# Patient Record
Sex: FEMALE | Race: WHITE | NOT HISPANIC OR LATINO | URBAN - METROPOLITAN AREA
[De-identification: names, ages, dates, MRNs, and addresses within clinical notes are randomized per-mention and may not be internally consistent; named-entity substitution may affect disease eponyms.]

---

## 2023-03-22 DIAGNOSIS — F41.9 ANXIETY: Primary | ICD-10-CM

## 2023-03-22 RX ORDER — DULOXETIN HYDROCHLORIDE 60 MG/1
60 CAPSULE, DELAYED RELEASE ORAL DAILY
COMMUNITY
Start: 2022-11-28 | End: 2023-03-22 | Stop reason: SDUPTHER

## 2023-03-22 RX ORDER — DULOXETIN HYDROCHLORIDE 60 MG/1
60 CAPSULE, DELAYED RELEASE ORAL DAILY
Qty: 90 CAPSULE | Refills: 0 | Status: SHIPPED | OUTPATIENT
Start: 2023-03-22 | End: 2023-04-04 | Stop reason: SDUPTHER

## 2023-03-26 PROBLEM — B37.2 CUTANEOUS CANDIDIASIS: Status: ACTIVE | Noted: 2023-03-26

## 2023-03-26 PROBLEM — H69.90 ETD (EUSTACHIAN TUBE DYSFUNCTION): Status: ACTIVE | Noted: 2023-03-26

## 2023-03-26 PROBLEM — E55.9 HYPOVITAMINOSIS D: Status: ACTIVE | Noted: 2023-03-26

## 2023-03-26 PROBLEM — J45.20 MILD INTERMITTENT ASTHMA (HHS-HCC): Status: ACTIVE | Noted: 2023-03-26

## 2023-03-26 PROBLEM — F41.9 ANXIETY AND DEPRESSION: Status: ACTIVE | Noted: 2023-03-26

## 2023-03-26 PROBLEM — F32.A ANXIETY AND DEPRESSION: Status: ACTIVE | Noted: 2023-03-26

## 2023-03-26 PROBLEM — R63.5 WEIGHT GAIN: Status: ACTIVE | Noted: 2023-03-26

## 2023-03-26 PROBLEM — J45.909 ASTHMA (HHS-HCC): Status: ACTIVE | Noted: 2023-03-26

## 2023-03-26 PROBLEM — K64.9 HEMORRHOID: Status: ACTIVE | Noted: 2023-03-26

## 2023-03-26 PROBLEM — E73.9 LACTOSE INTOLERANCE: Status: ACTIVE | Noted: 2023-03-26

## 2023-03-26 PROBLEM — K59.00 CONSTIPATION: Status: ACTIVE | Noted: 2023-03-26

## 2023-03-26 PROBLEM — N92.6 IRREGULAR MENSTRUAL CYCLE: Status: ACTIVE | Noted: 2023-03-26

## 2023-03-26 PROBLEM — F41.1 GENERALIZED ANXIETY DISORDER: Status: ACTIVE | Noted: 2023-03-26

## 2023-03-31 ENCOUNTER — APPOINTMENT (OUTPATIENT)
Dept: PRIMARY CARE | Facility: CLINIC | Age: 38
End: 2023-03-31
Payer: COMMERCIAL

## 2023-04-04 ENCOUNTER — OFFICE VISIT (OUTPATIENT)
Dept: PRIMARY CARE | Facility: CLINIC | Age: 38
End: 2023-04-04
Payer: COMMERCIAL

## 2023-04-04 VITALS
SYSTOLIC BLOOD PRESSURE: 100 MMHG | OXYGEN SATURATION: 98 % | BODY MASS INDEX: 28.35 KG/M2 | HEIGHT: 63 IN | DIASTOLIC BLOOD PRESSURE: 64 MMHG | HEART RATE: 68 BPM | WEIGHT: 160 LBS | TEMPERATURE: 97.8 F | RESPIRATION RATE: 16 BRPM

## 2023-04-04 DIAGNOSIS — F41.9 ANXIETY AND DEPRESSION: ICD-10-CM

## 2023-04-04 DIAGNOSIS — N92.6 IRREGULAR MENSTRUAL CYCLE: ICD-10-CM

## 2023-04-04 DIAGNOSIS — F41.9 ANXIETY: ICD-10-CM

## 2023-04-04 DIAGNOSIS — F41.1 GENERALIZED ANXIETY DISORDER: Primary | ICD-10-CM

## 2023-04-04 DIAGNOSIS — F32.A ANXIETY AND DEPRESSION: ICD-10-CM

## 2023-04-04 PROCEDURE — 99214 OFFICE O/P EST MOD 30 MIN: CPT | Performed by: FAMILY MEDICINE

## 2023-04-04 PROCEDURE — 1036F TOBACCO NON-USER: CPT | Performed by: FAMILY MEDICINE

## 2023-04-04 RX ORDER — DULOXETIN HYDROCHLORIDE 60 MG/1
60 CAPSULE, DELAYED RELEASE ORAL DAILY
Qty: 90 CAPSULE | Refills: 1 | Status: SHIPPED | OUTPATIENT
Start: 2023-04-04 | End: 2023-08-07 | Stop reason: SINTOL

## 2023-04-04 RX ORDER — LEVONORGESTREL 52 MG/1
1 INTRAUTERINE DEVICE INTRAUTERINE ONCE
COMMUNITY
End: 2024-05-13 | Stop reason: WASHOUT

## 2023-04-04 ASSESSMENT — ENCOUNTER SYMPTOMS
WEIGHT GAIN: 0
PANIC: 0
WEIGHT LOSS: 0
DEPRESSION: 1
THOUGHTS THAT DEATH WOULD BE EASIER: 0

## 2023-04-04 NOTE — PATIENT INSTRUCTIONS
Today we followed up on your anxiety and you are doing really well on the cymbalta .  Refills sent.   Follow up in 6 months for a medication check or we can see you in December for your physical and refill meds at that time.     Continue counseling/therapy and continue your exercise/yoga habits as these are all very helpful for your overall mental and physical health

## 2023-04-04 NOTE — PROGRESS NOTES
"Subjective   Patient ID: Angie Raymundo is a 38 y.o. female who presents for Depression.    She was on prozac last summer.  She was on a high dose so we decided to switch her because she wasn't able to access her \"normal\" emotions.  Since she switched over she has been able to feel more of a spectrum of emotions.  She feels like her HR is faster than normal but it is in the normal range.  She does feel like it is really helping she was off it for a week but she then restarted it when she picked it up again and feels good.  She doesn't necessarily want to be on it long term.      She started going to a  in December, doing yoga and also spinning again.          Depression  Visit Type: follow-up  Patient is not experiencing: panic, suicidal ideas, suicidal planning, thoughts of death, weight gain and weight loss.           Review of Systems   Constitutional:  Negative for weight gain and weight loss.   Psychiatric/Behavioral:  Positive for depression. Negative for suicidal ideas.        Objective   /64   Pulse 68   Temp 36.6 °C (97.8 °F)   Resp 16   Ht 1.6 m (5' 3\")   Wt 72.6 kg (160 lb)   SpO2 98%   BMI 28.34 kg/m²     Physical Exam  Constitutional:       Appearance: Normal appearance.   HENT:      Head: Normocephalic and atraumatic.      Mouth/Throat:      Mouth: Mucous membranes are moist.   Eyes:      Pupils: Pupils are equal, round, and reactive to light.   Cardiovascular:      Rate and Rhythm: Normal rate.      Pulses: Normal pulses.   Musculoskeletal:      Cervical back: Normal range of motion and neck supple.   Skin:     General: Skin is warm and dry.   Neurological:      General: No focal deficit present.      Mental Status: She is alert and oriented to person, place, and time. Mental status is at baseline.   Psychiatric:         Mood and Affect: Mood normal.         Behavior: Behavior normal.         Thought Content: Thought content normal.         Judgment: Judgment normal. "         Assessment/Plan   Diagnoses and all orders for this visit:  Generalized anxiety disorder  Anxiety and depression  Irregular menstrual cycle  Anxiety  -     DULoxetine (Cymbalta) 60 mg DR capsule; Take 1 capsule (60 mg) by mouth once daily.

## 2023-08-07 ENCOUNTER — OFFICE VISIT (OUTPATIENT)
Dept: PRIMARY CARE | Facility: CLINIC | Age: 38
End: 2023-08-07
Payer: COMMERCIAL

## 2023-08-07 VITALS
BODY MASS INDEX: 29.05 KG/M2 | RESPIRATION RATE: 16 BRPM | TEMPERATURE: 98.3 F | OXYGEN SATURATION: 98 % | WEIGHT: 164 LBS | DIASTOLIC BLOOD PRESSURE: 76 MMHG | HEART RATE: 72 BPM | SYSTOLIC BLOOD PRESSURE: 112 MMHG

## 2023-08-07 DIAGNOSIS — R63.5 WEIGHT GAIN: ICD-10-CM

## 2023-08-07 DIAGNOSIS — G47.00 INSOMNIA, UNSPECIFIED TYPE: ICD-10-CM

## 2023-08-07 DIAGNOSIS — F41.9 ANXIETY AND DEPRESSION: Primary | ICD-10-CM

## 2023-08-07 DIAGNOSIS — R45.4 IRRITABILITY: ICD-10-CM

## 2023-08-07 DIAGNOSIS — F32.A ANXIETY AND DEPRESSION: Primary | ICD-10-CM

## 2023-08-07 PROCEDURE — 1036F TOBACCO NON-USER: CPT | Performed by: FAMILY MEDICINE

## 2023-08-07 PROCEDURE — 99213 OFFICE O/P EST LOW 20 MIN: CPT | Performed by: FAMILY MEDICINE

## 2023-08-07 NOTE — PATIENT INSTRUCTIONS
Today we have addressed your mood changes and difficulty losing weight    You are going to talk to gyn about changed the mirena to a paraguard IUD  If it's going to take a few months for an appointment then we will restart you on low dose 30mg of cymbalta.   If they can see you soon and change it out then you will do that instead and try to see how your moods are without any hormone or medication on board.    You are also going to try to find a new therapist.   You are also working on diet/exercise for healthy habits long term  Please let me know how I can help going forward.

## 2023-08-07 NOTE — PROGRESS NOTES
Subjective   Patient ID: Angie Raymundo is a 38 y.o. female who presents for Anxiety (Patient tapered off the Cymbalta on her own thinking that the weight gain was related to the med. She is wondering if she should have removed the IUD instead of tapering off the Cymbalta. ).    She had been taking the cymbalta and working through some personal things.  She did not want to get pregnant so she got an IUD.  She is with a new partner.      Since getting the Mirena she has gained weight and cannot lose it. She had been feeling really god so she tapered off the cymbalta.  For the last couple of weeks though she is more irritable and she isn't sleeping well.  She has been off the cymbalta for about 6 weeks. She tapered it down over 2 weeks every other day, etc. Had some headaches when she was taking it.  Her mood swings are much worse all of a sudden.  Her periods have been extremely light for the past 4 months with the mirena.  - dark in color however.      Doesn't eat enough and when she does eat she eats more because she forgot to eat in a day.    She goes to yoga on Sundays but other than that she is moving a lot less    Anxiety             Review of Systems    Objective   /76   Pulse 72   Temp 36.8 °C (98.3 °F)   Resp 16   Wt 74.4 kg (164 lb)   SpO2 98%   BMI 29.05 kg/m²     Physical Exam  Constitutional:       Appearance: Normal appearance.   HENT:      Head: Normocephalic.   Pulmonary:      Effort: Pulmonary effort is normal. No respiratory distress.   Musculoskeletal:      Cervical back: Normal range of motion.   Skin:     General: Skin is warm.   Neurological:      Mental Status: She is alert.         Assessment/Plan   Diagnoses and all orders for this visit:  Anxiety and depression  Weight gain  Irritability  Insomnia, unspecified type

## 2024-05-13 ENCOUNTER — OFFICE VISIT (OUTPATIENT)
Dept: PRIMARY CARE | Facility: CLINIC | Age: 39
End: 2024-05-13
Payer: COMMERCIAL

## 2024-05-13 VITALS
OXYGEN SATURATION: 100 % | RESPIRATION RATE: 16 BRPM | HEIGHT: 63 IN | TEMPERATURE: 98.2 F | WEIGHT: 164 LBS | SYSTOLIC BLOOD PRESSURE: 110 MMHG | DIASTOLIC BLOOD PRESSURE: 68 MMHG | HEART RATE: 62 BPM | BODY MASS INDEX: 29.06 KG/M2

## 2024-05-13 DIAGNOSIS — Z12.4 CERVICAL CANCER SCREENING: ICD-10-CM

## 2024-05-13 DIAGNOSIS — F41.9 ANXIETY AND DEPRESSION: ICD-10-CM

## 2024-05-13 DIAGNOSIS — N92.6 IRREGULAR MENSTRUAL CYCLE: ICD-10-CM

## 2024-05-13 DIAGNOSIS — R63.5 WEIGHT GAIN: ICD-10-CM

## 2024-05-13 DIAGNOSIS — Z12.31 BREAST CANCER SCREENING BY MAMMOGRAM: ICD-10-CM

## 2024-05-13 DIAGNOSIS — Z00.00 HEALTHCARE MAINTENANCE: Primary | ICD-10-CM

## 2024-05-13 DIAGNOSIS — F32.A ANXIETY AND DEPRESSION: ICD-10-CM

## 2024-05-13 LAB
POC APPEARANCE, URINE: CLEAR
POC BILIRUBIN, URINE: NEGATIVE
POC BLOOD, URINE: NEGATIVE
POC COLOR, URINE: YELLOW
POC GLUCOSE, URINE: NEGATIVE MG/DL
POC KETONES, URINE: NEGATIVE MG/DL
POC LEUKOCYTES, URINE: NEGATIVE
POC NITRITE,URINE: NEGATIVE
POC PH, URINE: 6 PH
POC PROTEIN, URINE: NEGATIVE MG/DL
POC SPECIFIC GRAVITY, URINE: 1.01
POC UROBILINOGEN, URINE: 0.2 EU/DL

## 2024-05-13 PROCEDURE — 99396 PREV VISIT EST AGE 40-64: CPT | Performed by: FAMILY MEDICINE

## 2024-05-13 PROCEDURE — 1036F TOBACCO NON-USER: CPT | Performed by: FAMILY MEDICINE

## 2024-05-13 PROCEDURE — 81002 URINALYSIS NONAUTO W/O SCOPE: CPT | Performed by: FAMILY MEDICINE

## 2024-05-13 PROCEDURE — 99213 OFFICE O/P EST LOW 20 MIN: CPT | Performed by: FAMILY MEDICINE

## 2024-05-13 RX ORDER — COPPER 313.4 MG/1
INTRAUTERINE DEVICE INTRAUTERINE
COMMUNITY

## 2024-05-13 ASSESSMENT — PATIENT HEALTH QUESTIONNAIRE - PHQ9
1. LITTLE INTEREST OR PLEASURE IN DOING THINGS: NOT AT ALL
SUM OF ALL RESPONSES TO PHQ9 QUESTIONS 1 AND 2: 1
2. FEELING DOWN, DEPRESSED OR HOPELESS: SEVERAL DAYS

## 2024-05-13 NOTE — PATIENT INSTRUCTIONS
Today we performed your Annual Physical Exam.  Your preventative health care, labs and vaccinations have been reviewed and are up to date.      For your irregular cycles I have ordered additional labs and I recommend that you continue therapy/counseling due to your stress level/anxiety. You are not interested in medication at this time but the door is open to discuss this option if you need it.

## 2024-05-13 NOTE — PROGRESS NOTES
"Subjective   Patient ID: Angie Raymudno is a 39 y.o. female who presents for Annual Exam.    Current Outpatient Medications   Medication Instructions    copper (ParaGard T 380A) 380 square mm IUD intrauterine       Dentist and Eye Doctor appointments: Due   Immunizations: utd  Diet: Tries to be good about her diet- monitors protein and vegetables   Exercise: Orangetheory; additional running  Supplements: None  Menstrual Cycles: period came 2 weeks early last month  Sexually Active: yes/ IUD/ no std concerns  Pregnancy History:  Cancer Screening:    Cervical: DUE- GYN - midwife   Breast: due   Colon: N/A   Skin: wears sunscreen   DEXA: N/A         She isn't sleeping well, she is doing therapy, 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  The salary is better but the stress and sleep is a lot on her.  She is frustrated with her weight.  3 days out of the month she feels really good.  She is trying to give the new job a year.           Review of Systems    Objective   /68   Pulse 62   Temp 36.8 °C (98.2 °F)   Resp 16   Ht 1.6 m (5' 3\")   Wt 74.4 kg (164 lb)   LMP 2024 Comment: last pap ; will follow up with obgyn for pap  SpO2 100%   BMI 29.05 kg/m²     Physical Exam  Constitutional:       General: She is not in acute distress.     Appearance: She is well-developed. She is not diaphoretic.   HENT:      Head: Normocephalic and atraumatic.      Right Ear: Tympanic membrane normal.      Left Ear: Tympanic membrane normal.      Nose: Nose normal.      Mouth/Throat:      Mouth: Mucous membranes are moist.   Eyes:      General: No scleral icterus.     Pupils: Pupils are equal, round, and reactive to light.   Neck:      Thyroid: No thyromegaly.      Vascular: No JVD.   Cardiovascular:      Rate and Rhythm: Normal rate and regular rhythm.      Heart sounds: Normal heart sounds. No murmur heard.     No friction rub. No gallop.   Pulmonary:      Effort: Pulmonary " effort is normal. No respiratory distress.      Breath sounds: Normal breath sounds. No wheezing or rales.   Chest:      Chest wall: No tenderness.   Abdominal:      General: Bowel sounds are normal. There is no distension.      Palpations: Abdomen is soft. There is no mass.      Tenderness: There is no abdominal tenderness. There is no rebound.   Musculoskeletal:         General: Normal range of motion.      Cervical back: Normal range of motion and neck supple.   Lymphadenopathy:      Cervical: No cervical adenopathy.   Skin:     General: Skin is warm and dry.   Neurological:      General: No focal deficit present.      Mental Status: She is alert and oriented to person, place, and time.      Deep Tendon Reflexes: Reflexes normal.   Psychiatric:         Mood and Affect: Mood normal.         Behavior: Behavior normal.         Assessment/Plan   Diagnoses and all orders for this visit:  Healthcare maintenance  -     POCT UA (nonautomated) manually resulted  -     CBC; Future  -     Comprehensive Metabolic Panel; Future  -     Lipid Panel; Future  -     Vitamin D 25 hydroxy; Future  -     TSH with reflex to Free T4 if abnormal; Future  Weight gain  Irregular menstrual cycle  -     Luteinizing hormone; Future  -     FSH; Future  -     Testosterone; Future  -     Prolactin level; Future  -     HCG, quantitative, pregnancy; Future  Cervical cancer screening  Comments:  DUE 2024  Breast cancer screening by mammogram  -     BI mammo bilateral screening tomosynthesis; Future  Anxiety and depression

## 2024-05-31 ENCOUNTER — HOSPITAL ENCOUNTER (OUTPATIENT)
Dept: RADIOLOGY | Facility: HOSPITAL | Age: 39
Discharge: HOME | End: 2024-05-31
Payer: COMMERCIAL

## 2024-05-31 VITALS — HEIGHT: 63 IN | BODY MASS INDEX: 28.35 KG/M2 | WEIGHT: 160 LBS

## 2024-05-31 DIAGNOSIS — Z12.31 BREAST CANCER SCREENING BY MAMMOGRAM: ICD-10-CM

## 2024-05-31 PROCEDURE — 77067 SCR MAMMO BI INCL CAD: CPT | Performed by: RADIOLOGY

## 2024-05-31 PROCEDURE — 77063 BREAST TOMOSYNTHESIS BI: CPT | Performed by: RADIOLOGY

## 2024-05-31 PROCEDURE — 77067 SCR MAMMO BI INCL CAD: CPT

## 2024-06-03 ENCOUNTER — APPOINTMENT (OUTPATIENT)
Dept: PRIMARY CARE | Facility: CLINIC | Age: 39
End: 2024-06-03
Payer: COMMERCIAL

## 2024-07-02 ENCOUNTER — APPOINTMENT (OUTPATIENT)
Dept: OBSTETRICS AND GYNECOLOGY | Facility: CLINIC | Age: 39
End: 2024-07-02
Payer: COMMERCIAL

## 2024-07-02 VITALS
WEIGHT: 164 LBS | BODY MASS INDEX: 29.06 KG/M2 | HEIGHT: 63 IN | DIASTOLIC BLOOD PRESSURE: 54 MMHG | SYSTOLIC BLOOD PRESSURE: 102 MMHG

## 2024-07-02 DIAGNOSIS — Z12.4 CERVICAL CANCER SCREENING: ICD-10-CM

## 2024-07-02 DIAGNOSIS — Z01.419 WELL WOMAN EXAM WITH ROUTINE GYNECOLOGICAL EXAM: Primary | ICD-10-CM

## 2024-07-02 PROCEDURE — 99396 PREV VISIT EST AGE 40-64: CPT

## 2024-07-02 PROCEDURE — 87624 HPV HI-RISK TYP POOLED RSLT: CPT

## 2024-07-02 PROCEDURE — 1036F TOBACCO NON-USER: CPT

## 2024-07-02 NOTE — PROGRESS NOTES
"JAMILA ( Breast and Pelvic exam )    Last pap:  2.20.18 ( WNL -HPV ) TODAY  Last mammo: ..24 ( NEG )    Chaperone declined: PATO Bazzi    Assessment/Plan     39 y.o. presents for well woman exam.     Cervical Cancer Screening  - PAP today  - Reviewed ASCCP guidelines with pt; next PAP in 5 yrs if nml, HPV neg    STD Screening  - declines    Contraceptive Plan  - ParaGard IUD in place; due for removal     Difficulty with weight loss  - encouraged pt to complete pending orders for LH, FSH, Prolactin, and TSH as ordered by PCP  - diet and exercise reviewed; recommended 150min exercise per week or 30min/day x5 days    Health Maintenance  - SBE reviewed and encouraged monthly  - PFPT placed to address possible pelvic floor weakness  - Mammo done 2024 Cat 1 neg; continue q1-2 years  - Routine follow up with PCP for health maintenance examination encouraged    F/U in 1 year or as needed.    JORGE Grant-SOTO     Subjective     Angie Raymundo is a 39 y.o. female presenting for a well woman exam. Would like recommendations on weight loss. Feels stressed from work. Concern for pelvic floor prolapse \"feels pressure during period\"    PMH, PSH, allergies, and current medications reviewed - see chart.  Family Hx reviewed. Pt reports no family hx of gynecologic or breast cancer in first degree family members.   OB Hx:      Social Hx:  - relationship: dating for 1.5 years  - sexually active: yes  - employment: healthcare analytics  - diet: balanced  - exercise: yes  - denies tobacco or illicit drug use. reports occasional alcohol use.     Sexual Concerns: lower libido before menses  PAP Hx: Last PAP 18 nml, HPV neg  Last mammo: 24 nml Cat 1  STI Hx: denies  Contraception: ParaGard IUD placed by myself 22  Menstrual Periods: Patient's last menstrual period was 2024. Periods are regular every 28-30 days, lasting 5 days.    Objective     /54   Ht 1.6 m (5' 3\")   Wt 74.4 kg (164 " lb)   LMP 06/11/2024 Comment: paragard  BMI 29.05 kg/m²     Physical Exam  Vitals reviewed.   Constitutional:       General: She is not in acute distress.     Appearance: Normal appearance.   HENT:      Head: Normocephalic and atraumatic.      Mouth/Throat:      Palate: No mass.   Neck:      Thyroid: No thyroid mass, thyromegaly or thyroid tenderness.   Cardiovascular:      Rate and Rhythm: Normal rate and regular rhythm.      Heart sounds: Normal heart sounds, S1 normal and S2 normal.   Pulmonary:      Effort: Pulmonary effort is normal.      Breath sounds: Normal breath sounds.   Chest:      Comments: Breast exam offered; pt declined  Abdominal:      General: Abdomen is flat.      Palpations: Abdomen is soft.      Tenderness: There is no abdominal tenderness.   Genitourinary:     General: Normal vulva.      Exam position: Lithotomy position.      Pubic Area: No rash.       Labia:         Right: No rash or lesion.         Left: No rash or lesion.       Urethra: No prolapse, urethral swelling or urethral lesion.      Vagina: Normal.      Cervix: No cervical motion tenderness, discharge or lesion.      Uterus: Not enlarged and not tender.       Adnexa:         Right: No mass or tenderness.          Left: No mass or tenderness.        Comments: IUD strings visualized  Musculoskeletal:         General: Normal range of motion.      Cervical back: Normal range of motion and neck supple.   Skin:     General: Skin is warm and dry.   Neurological:      Mental Status: She is alert and oriented to person, place, and time.   Psychiatric:         Mood and Affect: Mood normal.         Behavior: Behavior normal.         Thought Content: Thought content normal.         Judgment: Judgment normal.

## 2024-08-07 ENCOUNTER — APPOINTMENT (OUTPATIENT)
Dept: PRIMARY CARE | Facility: CLINIC | Age: 39
End: 2024-08-07
Payer: COMMERCIAL

## 2024-08-20 ENCOUNTER — PATIENT MESSAGE (OUTPATIENT)
Dept: PRIMARY CARE | Facility: CLINIC | Age: 39
End: 2024-08-20
Payer: COMMERCIAL

## 2024-08-20 DIAGNOSIS — R63.5 WEIGHT GAIN: Primary | ICD-10-CM

## 2024-10-16 ENCOUNTER — OFFICE VISIT (OUTPATIENT)
Dept: PRIMARY CARE | Facility: CLINIC | Age: 39
End: 2024-10-16
Payer: COMMERCIAL

## 2024-10-16 VITALS
RESPIRATION RATE: 18 BRPM | BODY MASS INDEX: 30.12 KG/M2 | HEIGHT: 63 IN | OXYGEN SATURATION: 93 % | WEIGHT: 170 LBS | HEART RATE: 82 BPM | SYSTOLIC BLOOD PRESSURE: 112 MMHG | DIASTOLIC BLOOD PRESSURE: 70 MMHG | TEMPERATURE: 98.6 F

## 2024-10-16 DIAGNOSIS — H66.91 RIGHT OTITIS MEDIA, UNSPECIFIED OTITIS MEDIA TYPE: ICD-10-CM

## 2024-10-16 DIAGNOSIS — H69.90 DYSFUNCTION OF EUSTACHIAN TUBE, UNSPECIFIED LATERALITY: Primary | ICD-10-CM

## 2024-10-16 PROCEDURE — 3008F BODY MASS INDEX DOCD: CPT | Performed by: FAMILY MEDICINE

## 2024-10-16 PROCEDURE — 99213 OFFICE O/P EST LOW 20 MIN: CPT | Performed by: FAMILY MEDICINE

## 2024-10-16 PROCEDURE — 1036F TOBACCO NON-USER: CPT | Performed by: FAMILY MEDICINE

## 2024-10-16 RX ORDER — AMOXICILLIN 875 MG/1
875 TABLET, FILM COATED ORAL 2 TIMES DAILY
Qty: 20 TABLET | Refills: 0 | Status: SHIPPED | OUTPATIENT
Start: 2024-10-16 | End: 2024-10-26

## 2024-10-16 RX ORDER — FLUTICASONE PROPIONATE 50 MCG
2 SPRAY, SUSPENSION (ML) NASAL DAILY
Qty: 16 G | Refills: 2 | Status: SHIPPED | OUTPATIENT
Start: 2024-10-16 | End: 2025-10-16

## 2024-10-16 NOTE — PATIENT INSTRUCTIONS
Today we have addressed your right otitis media  Start antibiotics and flonase as well as zyrtec d.   If rupture occurs please contact the office or follow up with your ENT.

## 2024-10-16 NOTE — PROGRESS NOTES
"Current Outpatient Medications   Medication Sig Dispense Refill    copper (ParaGard T 380A) 380 square mm IUD by intrauterine route.       No current facility-administered medications for this visit.   Subjective   Patient ID: Angie Raymundo is a 39 y.o. female who presents for Earache (Right ear ).    She states that she has had her ear drum rupture in the past.  She woke up this morning and notice it was harder to hear and she feels like the ear drum has pressure.  She denies fevers, chills, stuffy nose,  and has some allergy symptoms this time of year.  She has been using an elderberry/zinc gummy.  She doesn't use any nasal sprays on a regular basis.    Earache          Review of Systems   HENT:  Positive for ear pain.        Objective   /70 (BP Location: Left arm, Patient Position: Sitting)   Pulse 82   Temp 37 °C (98.6 °F)   Resp 18   Ht 1.6 m (5' 3\")   Wt 77.1 kg (170 lb)   LMP 09/27/2024 (Exact Date)   SpO2 93%   BMI 30.11 kg/m²     Physical Exam  Constitutional:       Appearance: Normal appearance.   HENT:      Head: Normocephalic and atraumatic.      Right Ear: Ear canal normal. Tympanic membrane is injected, erythematous and bulging. Tympanic membrane is not perforated.      Left Ear: Tympanic membrane normal.      Ears:      Comments: Right TM is red, cloudy, early infection     Nose: Congestion and rhinorrhea present.      Mouth/Throat:      Mouth: Mucous membranes are moist.   Cardiovascular:      Rate and Rhythm: Normal rate and regular rhythm.      Pulses: Normal pulses.   Pulmonary:      Effort: Pulmonary effort is normal. No respiratory distress.      Breath sounds: Normal breath sounds. No stridor. No wheezing, rhonchi or rales.   Musculoskeletal:      Cervical back: Normal range of motion and neck supple.   Skin:     General: Skin is warm and dry.   Neurological:      Mental Status: She is alert.   Psychiatric:         Mood and Affect: Mood normal.         Assessment/Plan "   Diagnoses and all orders for this visit:  Dysfunction of Eustachian tube, unspecified laterality  -     fluticasone (Flonase Allergy Relief) 50 mcg/actuation nasal spray; Administer 2 sprays into each nostril once daily. Shake gently. Before first use, prime pump. After use, clean tip and replace cap.  Right otitis media, unspecified otitis media type  -     amoxicillin (Amoxil) 875 mg tablet; Take 1 tablet (875 mg) by mouth 2 times a day for 10 days.

## 2024-10-24 ENCOUNTER — PATIENT MESSAGE (OUTPATIENT)
Dept: PRIMARY CARE | Facility: CLINIC | Age: 39
End: 2024-10-24
Payer: COMMERCIAL

## 2024-10-24 DIAGNOSIS — H69.90 DYSFUNCTION OF EUSTACHIAN TUBE, UNSPECIFIED LATERALITY: Primary | ICD-10-CM

## 2024-10-24 RX ORDER — PREDNISONE 20 MG/1
20 TABLET ORAL ONCE
Status: DISCONTINUED | OUTPATIENT
Start: 2024-10-24 | End: 2024-10-25

## 2024-10-25 RX ORDER — PREDNISONE 20 MG/1
TABLET ORAL
Qty: 21 TABLET | Refills: 0 | Status: SHIPPED | OUTPATIENT
Start: 2024-10-25

## 2024-11-07 DIAGNOSIS — H69.90 DYSFUNCTION OF EUSTACHIAN TUBE, UNSPECIFIED LATERALITY: ICD-10-CM

## 2024-11-07 RX ORDER — FLUTICASONE PROPIONATE 50 MCG
2 SPRAY, SUSPENSION (ML) NASAL DAILY
Qty: 48 ML | Refills: 1 | OUTPATIENT
Start: 2024-11-07 | End: 2025-11-07